# Patient Record
Sex: FEMALE | Race: OTHER | NOT HISPANIC OR LATINO | Employment: UNEMPLOYED | URBAN - METROPOLITAN AREA
[De-identification: names, ages, dates, MRNs, and addresses within clinical notes are randomized per-mention and may not be internally consistent; named-entity substitution may affect disease eponyms.]

---

## 2017-01-06 ENCOUNTER — ALLSCRIPTS OFFICE VISIT (OUTPATIENT)
Dept: OTHER | Facility: OTHER | Age: 57
End: 2017-01-06

## 2017-01-06 DIAGNOSIS — I10 ESSENTIAL (PRIMARY) HYPERTENSION: ICD-10-CM

## 2017-01-06 DIAGNOSIS — Z12.31 ENCOUNTER FOR SCREENING MAMMOGRAM FOR MALIGNANT NEOPLASM OF BREAST: ICD-10-CM

## 2017-01-06 DIAGNOSIS — B37.3 CANDIDIASIS OF VULVA AND VAGINA: ICD-10-CM

## 2017-01-06 DIAGNOSIS — E11.9 TYPE 2 DIABETES MELLITUS WITHOUT COMPLICATIONS (HCC): ICD-10-CM

## 2017-01-06 DIAGNOSIS — G57.92 MONONEUROPATHY OF LEFT LOWER EXTREMITY: ICD-10-CM

## 2017-01-06 LAB
BILIRUB UR QL STRIP: NEGATIVE
CLARITY UR: NORMAL
COLOR UR: YELLOW
GLUCOSE (HISTORICAL): NORMAL
GLUCOSE SERPL-MCNC: 265 MG/DL
HGB UR QL STRIP.AUTO: NORMAL
KETONES UR STRIP-MCNC: NORMAL MG/DL
LEUKOCYTE ESTERASE UR QL STRIP: NEGATIVE
NITRITE UR QL STRIP: NEGATIVE
PH UR STRIP.AUTO: 5.5 [PH]
PROT UR STRIP-MCNC: NEGATIVE MG/DL
SP GR UR STRIP.AUTO: 1.03
UROBILINOGEN UR QL STRIP.AUTO: 3.5

## 2017-01-09 ENCOUNTER — APPOINTMENT (OUTPATIENT)
Dept: LAB | Age: 57
End: 2017-01-09
Payer: COMMERCIAL

## 2017-01-09 ENCOUNTER — TRANSCRIBE ORDERS (OUTPATIENT)
Dept: ADMINISTRATIVE | Age: 57
End: 2017-01-09

## 2017-01-09 DIAGNOSIS — I10 ESSENTIAL (PRIMARY) HYPERTENSION: ICD-10-CM

## 2017-01-09 DIAGNOSIS — G57.92 MONONEUROPATHY OF LEFT LOWER EXTREMITY: ICD-10-CM

## 2017-01-09 DIAGNOSIS — E11.9 TYPE 2 DIABETES MELLITUS WITHOUT COMPLICATIONS (HCC): ICD-10-CM

## 2017-01-09 DIAGNOSIS — B37.3 CANDIDIASIS OF VULVA AND VAGINA: ICD-10-CM

## 2017-01-09 LAB
ALBUMIN SERPL BCP-MCNC: 3.3 G/DL (ref 3.5–5)
ALP SERPL-CCNC: 93 U/L (ref 46–116)
ALT SERPL W P-5'-P-CCNC: 24 U/L (ref 12–78)
ANION GAP SERPL CALCULATED.3IONS-SCNC: 6 MMOL/L (ref 4–13)
AST SERPL W P-5'-P-CCNC: 12 U/L (ref 5–45)
BASOPHILS # BLD AUTO: 0.05 THOUSANDS/ΜL (ref 0–0.1)
BASOPHILS NFR BLD AUTO: 1 % (ref 0–1)
BILIRUB SERPL-MCNC: 0.39 MG/DL (ref 0.2–1)
BUN SERPL-MCNC: 9 MG/DL (ref 5–25)
CALCIUM SERPL-MCNC: 9 MG/DL (ref 8.3–10.1)
CHLORIDE SERPL-SCNC: 101 MMOL/L (ref 100–108)
CHOLEST SERPL-MCNC: 178 MG/DL (ref 50–200)
CO2 SERPL-SCNC: 30 MMOL/L (ref 21–32)
CREAT SERPL-MCNC: 0.67 MG/DL (ref 0.6–1.3)
CREAT UR-MCNC: 148 MG/DL
EOSINOPHIL # BLD AUTO: 0.4 THOUSAND/ΜL (ref 0–0.61)
EOSINOPHIL NFR BLD AUTO: 5 % (ref 0–6)
ERYTHROCYTE [DISTWIDTH] IN BLOOD BY AUTOMATED COUNT: 13.4 % (ref 11.6–15.1)
EST. AVERAGE GLUCOSE BLD GHB EST-MCNC: 292 MG/DL
GFR SERPL CREATININE-BSD FRML MDRD: >60 ML/MIN/1.73SQ M
GLUCOSE SERPL-MCNC: 262 MG/DL (ref 65–140)
HBA1C MFR BLD: 11.8 % (ref 4.2–6.3)
HCT VFR BLD AUTO: 41.8 % (ref 34.8–46.1)
HDLC SERPL-MCNC: 40 MG/DL (ref 40–60)
HGB BLD-MCNC: 14.1 G/DL (ref 11.5–15.4)
LDLC SERPL CALC-MCNC: 90 MG/DL (ref 0–100)
LYMPHOCYTES # BLD AUTO: 2.26 THOUSANDS/ΜL (ref 0.6–4.47)
LYMPHOCYTES NFR BLD AUTO: 26 % (ref 14–44)
MCH RBC QN AUTO: 28.8 PG (ref 26.8–34.3)
MCHC RBC AUTO-ENTMCNC: 33.7 G/DL (ref 31.4–37.4)
MCV RBC AUTO: 85 FL (ref 82–98)
MICROALBUMIN UR-MCNC: 7.8 MG/L (ref 0–20)
MICROALBUMIN/CREAT 24H UR: 5 MG/G CREATININE (ref 0–30)
MONOCYTES # BLD AUTO: 0.47 THOUSAND/ΜL (ref 0.17–1.22)
MONOCYTES NFR BLD AUTO: 5 % (ref 4–12)
NEUTROPHILS # BLD AUTO: 5.5 THOUSANDS/ΜL (ref 1.85–7.62)
NEUTS SEG NFR BLD AUTO: 63 % (ref 43–75)
NRBC BLD AUTO-RTO: 0 /100 WBCS
PLATELET # BLD AUTO: 187 THOUSANDS/UL (ref 149–390)
PMV BLD AUTO: 12 FL (ref 8.9–12.7)
POTASSIUM SERPL-SCNC: 4.4 MMOL/L (ref 3.5–5.3)
PROT SERPL-MCNC: 7 G/DL (ref 6.4–8.2)
RBC # BLD AUTO: 4.9 MILLION/UL (ref 3.81–5.12)
SODIUM SERPL-SCNC: 137 MMOL/L (ref 136–145)
TRIGL SERPL-MCNC: 241 MG/DL
WBC # BLD AUTO: 8.73 THOUSAND/UL (ref 4.31–10.16)

## 2017-01-09 PROCEDURE — 82570 ASSAY OF URINE CREATININE: CPT

## 2017-01-09 PROCEDURE — 36415 COLL VENOUS BLD VENIPUNCTURE: CPT

## 2017-01-09 PROCEDURE — 80053 COMPREHEN METABOLIC PANEL: CPT

## 2017-01-09 PROCEDURE — 85025 COMPLETE CBC W/AUTO DIFF WBC: CPT

## 2017-01-09 PROCEDURE — 83036 HEMOGLOBIN GLYCOSYLATED A1C: CPT

## 2017-01-09 PROCEDURE — 80061 LIPID PANEL: CPT

## 2017-01-09 PROCEDURE — 82043 UR ALBUMIN QUANTITATIVE: CPT

## 2017-01-11 ENCOUNTER — GENERIC CONVERSION - ENCOUNTER (OUTPATIENT)
Dept: OTHER | Facility: OTHER | Age: 57
End: 2017-01-11

## 2017-04-15 ENCOUNTER — TRANSCRIBE ORDERS (OUTPATIENT)
Dept: ADMINISTRATIVE | Age: 57
End: 2017-04-15

## 2017-04-15 ENCOUNTER — APPOINTMENT (OUTPATIENT)
Dept: LAB | Age: 57
End: 2017-04-15
Payer: COMMERCIAL

## 2017-04-15 DIAGNOSIS — E11.9 TYPE 2 DIABETES MELLITUS WITHOUT COMPLICATIONS (HCC): ICD-10-CM

## 2017-04-15 LAB
ANION GAP SERPL CALCULATED.3IONS-SCNC: 7 MMOL/L (ref 4–13)
BUN SERPL-MCNC: 10 MG/DL (ref 5–25)
CALCIUM SERPL-MCNC: 9.1 MG/DL (ref 8.3–10.1)
CHLORIDE SERPL-SCNC: 102 MMOL/L (ref 100–108)
CO2 SERPL-SCNC: 29 MMOL/L (ref 21–32)
CREAT SERPL-MCNC: 0.76 MG/DL (ref 0.6–1.3)
EST. AVERAGE GLUCOSE BLD GHB EST-MCNC: 194 MG/DL
GFR SERPL CREATININE-BSD FRML MDRD: >60 ML/MIN/1.73SQ M
GLUCOSE P FAST SERPL-MCNC: 179 MG/DL (ref 65–99)
HBA1C MFR BLD: 8.4 % (ref 4.2–6.3)
POTASSIUM SERPL-SCNC: 4.6 MMOL/L (ref 3.5–5.3)
SODIUM SERPL-SCNC: 138 MMOL/L (ref 136–145)

## 2017-04-15 PROCEDURE — 36415 COLL VENOUS BLD VENIPUNCTURE: CPT

## 2017-04-15 PROCEDURE — 83036 HEMOGLOBIN GLYCOSYLATED A1C: CPT

## 2017-04-15 PROCEDURE — 80048 BASIC METABOLIC PNL TOTAL CA: CPT

## 2017-04-28 ENCOUNTER — ALLSCRIPTS OFFICE VISIT (OUTPATIENT)
Dept: OTHER | Facility: OTHER | Age: 57
End: 2017-04-28

## 2017-04-28 DIAGNOSIS — E11.9 TYPE 2 DIABETES MELLITUS WITHOUT COMPLICATIONS (HCC): ICD-10-CM

## 2017-04-28 DIAGNOSIS — I10 ESSENTIAL (PRIMARY) HYPERTENSION: ICD-10-CM

## 2017-04-28 DIAGNOSIS — G57.92 MONONEUROPATHY OF LEFT LOWER EXTREMITY: ICD-10-CM

## 2017-04-28 DIAGNOSIS — Z87.09 PERSONAL HISTORY OF OTHER DISEASES OF THE RESPIRATORY SYSTEM: ICD-10-CM

## 2017-04-28 DIAGNOSIS — Z00.00 ENCOUNTER FOR GENERAL ADULT MEDICAL EXAMINATION WITHOUT ABNORMAL FINDINGS: ICD-10-CM

## 2017-11-17 ENCOUNTER — TRANSCRIBE ORDERS (OUTPATIENT)
Dept: ADMINISTRATIVE | Age: 57
End: 2017-11-17

## 2017-11-17 ENCOUNTER — APPOINTMENT (OUTPATIENT)
Dept: LAB | Age: 57
End: 2017-11-17
Payer: COMMERCIAL

## 2017-11-17 DIAGNOSIS — I10 ESSENTIAL (PRIMARY) HYPERTENSION: ICD-10-CM

## 2017-11-17 DIAGNOSIS — Z87.09 PERSONAL HISTORY OF OTHER DISEASES OF THE RESPIRATORY SYSTEM: ICD-10-CM

## 2017-11-17 DIAGNOSIS — Z00.00 ENCOUNTER FOR GENERAL ADULT MEDICAL EXAMINATION WITHOUT ABNORMAL FINDINGS: ICD-10-CM

## 2017-11-17 DIAGNOSIS — E11.9 TYPE 2 DIABETES MELLITUS WITHOUT COMPLICATIONS (HCC): ICD-10-CM

## 2017-11-17 DIAGNOSIS — G57.92 MONONEUROPATHY OF LEFT LOWER EXTREMITY: ICD-10-CM

## 2017-11-17 LAB
ANION GAP SERPL CALCULATED.3IONS-SCNC: 5 MMOL/L (ref 4–13)
BUN SERPL-MCNC: 7 MG/DL (ref 5–25)
CALCIUM SERPL-MCNC: 9.1 MG/DL (ref 8.3–10.1)
CHLORIDE SERPL-SCNC: 104 MMOL/L (ref 100–108)
CHOLEST SERPL-MCNC: 182 MG/DL (ref 50–200)
CO2 SERPL-SCNC: 29 MMOL/L (ref 21–32)
CREAT SERPL-MCNC: 0.77 MG/DL (ref 0.6–1.3)
EST. AVERAGE GLUCOSE BLD GHB EST-MCNC: 229 MG/DL
GFR SERPL CREATININE-BSD FRML MDRD: 85 ML/MIN/1.73SQ M
GLUCOSE P FAST SERPL-MCNC: 177 MG/DL (ref 65–99)
HBA1C MFR BLD: 9.6 % (ref 4.2–6.3)
HDLC SERPL-MCNC: 38 MG/DL (ref 40–60)
LDLC SERPL CALC-MCNC: 82 MG/DL (ref 0–100)
POTASSIUM SERPL-SCNC: 4.8 MMOL/L (ref 3.5–5.3)
SODIUM SERPL-SCNC: 138 MMOL/L (ref 136–145)
TRIGL SERPL-MCNC: 312 MG/DL

## 2017-11-17 PROCEDURE — 83036 HEMOGLOBIN GLYCOSYLATED A1C: CPT

## 2017-11-17 PROCEDURE — 80048 BASIC METABOLIC PNL TOTAL CA: CPT

## 2017-11-17 PROCEDURE — 80061 LIPID PANEL: CPT

## 2017-11-17 PROCEDURE — 36415 COLL VENOUS BLD VENIPUNCTURE: CPT

## 2017-11-20 ENCOUNTER — GENERIC CONVERSION - ENCOUNTER (OUTPATIENT)
Dept: OTHER | Facility: OTHER | Age: 57
End: 2017-11-20

## 2017-12-08 ENCOUNTER — ALLSCRIPTS OFFICE VISIT (OUTPATIENT)
Dept: OTHER | Facility: OTHER | Age: 57
End: 2017-12-08

## 2017-12-08 ENCOUNTER — APPOINTMENT (OUTPATIENT)
Dept: LAB | Facility: HOSPITAL | Age: 57
End: 2017-12-08
Payer: COMMERCIAL

## 2017-12-08 DIAGNOSIS — Z12.11 ENCOUNTER FOR SCREENING FOR MALIGNANT NEOPLASM OF COLON: ICD-10-CM

## 2017-12-08 DIAGNOSIS — E11.9 TYPE 2 DIABETES MELLITUS WITHOUT COMPLICATIONS (HCC): ICD-10-CM

## 2017-12-08 LAB
CREAT UR-MCNC: 37.3 MG/DL
MICROALBUMIN UR-MCNC: <5 MG/L (ref 0–20)
MICROALBUMIN/CREAT 24H UR: <13 MG/G CREATININE (ref 0–30)

## 2017-12-08 PROCEDURE — 82043 UR ALBUMIN QUANTITATIVE: CPT

## 2017-12-08 PROCEDURE — 82570 ASSAY OF URINE CREATININE: CPT

## 2017-12-09 NOTE — PROGRESS NOTES
Assessment    1  Diabetes mellitus, type 2 (250 00) (E11 9)   2  Hypertension (401 9) (I10)   3  Acute low back pain (724 2) (M54 5)    Plan  Acute low back pain    · Ibuprofen 600 MG Oral Tablet; take one tablet every 8 hours as needed for pain   · Methocarbamol 750 MG Oral Tablet; Take one tablets 3-4 times daily as needed forpain/muscle spasms  Diabetes mellitus, type 2    · Invokana 100 MG Oral Tablet; take one tablet by mouth daily   · (1) BASIC METABOLIC PROFILE; Status:Active; Requested for:16Sgl6078;    · (1) HEMOGLOBIN A1C; Status:Active; Requested for:16Ygx7126;    · (1) MICROALBUMIN CREATININE RATIO, RANDOM URINE; Status:Active; Requestedfor:87Lyo5888;    · *VB - Eye Exam; Status:Active; Requested for:96Uar7777;   Encounter for screening colonoscopy    · * MAMMO SCREENING BILATERAL W CAD; Status:Hold For - Scheduling; Requestedfor:65Vxp0310;    · COLONOSCOPY; Status:Active; Requested for:29Vmc7582;    · *1 -  [facility]  *  Status:Active  Requested for: 40DNE6611  Care Summary provided  : Yes    Discussion/Summary    Apply heat to area, take meds as directed  if no improvement of sxs in 10 day or any worsenign sxsadd med to DM regimen, discussed better diet and wt loss  Hydrate well with water 6-8 glasses a dayin 3 mo + labs  The patient was counseled regarding diagnostic results,-- instructions for management,-- risk factor reductions,-- impressions,-- risks and benefits of treatment options,-- importance of compliance with treatment  total time of encounter was 30 minutes  Possible side effects of new medications were reviewed with the patient/guardian today  The treatment plan was reviewed with the patient/guardian  The patient/guardian understands and agrees with the treatment plan      Chief Complaint  Patient here for follow upcomplain of back pain for 2 dayscreening neg   Patient is here today for follow up of chronic conditions described in HPI  History of Present Illness  Pt presents for follow up of chronic conditions and labs  uncontrolled  A1c 9 6  compliant with meds, admits not following diet  she bend forward and when got up she got back pain, has taken Tylenol with no improvement  Also using heating pad  No numbness, no weakness, no tingling  Review of Systems   Constitutional: No fever, no chills, feels well, no tiredness, no recent weight gain or weight loss  Cardiovascular: No complaints of slow heart rate, no fast heart rate, no chest pain, no palpitations, no leg claudication, no lower extremity edema  Respiratory: No complaints of shortness of breath, no wheezing, no cough, no SOB on exertion, no orthopnea, no PND  Gastrointestinal: No complaints of abdominal pain, no constipation, no nausea or vomiting, no diarrhea, no bloody stools  Genitourinary: No complaints of dysuria, no incontinence, no pelvic pain, no dysmenorrhea, no vaginal discharge or bleeding  Psychiatric: Not suicidal, no sleep disturbance, no anxiety or depression, no change in personality, no emotional problems  Active Problems    1  Diabetes mellitus, type 2 (250 00) (E11 9)   2  Encounter for screening colonoscopy (V76 51) (Z12 11)   3  Encounter for screening mammogram for malignant neoplasm of breast (V76 12) (Z12 31)   4  Hypertension (401 9) (I10)   5  Influenza vaccine needed (V04 81) (Z23)   6  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)   7  Vaginal yeast infection (112 1) (B37 3)    Past Medical History  1  History of Bloody stools (578 1) (K92 1)   2  History of Eye problem (V41 1) (H57 9)   3  History of asthma (V12 69) (Z87 09)   4  History of backache (V13 59) (Z87 39)   5  History of diarrhea (V12 79) (Z87 898)   6  History of insomnia (V13 89) (Z87 898)   7  History of pneumonia (V12 61) (Z87 01)   8  History of Indigestion (536 8) (K30)    The active problems and past medical history were reviewed and updated today        Surgical History  1  History of Hysterectomy    Family History  Mother    1  Family history of asthma (V17 5) (Z82 5)  Father    2  Family history of cardiac disorder (V17 49) (Z82 49)   3  Family history of hypertension (V17 49) (Z82 49)  Son    4  Family history of asthma (V17 5) (Z82 5)  Sister    5  Family history of diabetes mellitus (V18 0) (Z83 3)   6  Family history of hypertension (V17 49) (Z82 49)   7  Family history of kidney disease (V18 69) (Z84 1)   8  Family history of malignant neoplasm (V16 9) (Z80 9)   9  Family history of osteoporosis (V17 81) (Z82 62)  Brother    10  Family history of cardiac disorder (V17 49) (Z82 49)   11  Family history of cerebrovascular accident (CVA) (V17 1) (Z82 3)   15  Family history of malignant neoplasm (V16 9) (Z80 9)    Social History     · Always uses seat belt   · Caffeine use (V49 89) (F15 90)   · Does not drink alcohol (V49 89) (Z78 9)   · Housewife or homemaker   · Inadequate exercise (V69 0) (Z72 3)   ·    · Never a smoker   · No illicit drug use   · Non-smoker (V49 89) (Z78 9)   · Three children    Current Meds   1  Gabapentin 600 MG Oral Tablet; TAKE 2 TABLET AT BEDTIME  Requested for: 21EMF1482; Last Rx:09Oct2017 Ordered   2  Glimepiride 4 MG Oral Tablet; TAKE ONE TABLET BY MOUTH ONCE DAILY AS DIRECTED; Therapy: 10TOT3990 to (Evaluate:36Noa1238)  Requested for: 34UZO9655; Last Rx:17Oct2017 Ordered   3  Lisinopril 10 MG Oral Tablet; TAKE 1 TABLET DAILY  Requested for: 04HRB9300; Last Rx:09Oct2017 Ordered   4  MetFORMIN HCl - 1000 MG Oral Tablet; TAKE 1 TABLET TWICE DAILY; Therapy: 10NBW9722 to (Evaluate:61Res1819)  Requested for: 24GGA3525; Last Rx:30Nov2017 Ordered   5  Ventolin  (90 Base) MCG/ACT Inhalation Aerosol Solution; INHALE 2 PUFFS EVERY 4-6 HOURS AS NEEDED; Therapy: 28Apr2017 to (Kel Ca)  Requested for: 28Apr2017; Last Rx:28Apr2017 Ordered    The medication list was reviewed and updated today  Allergies  1   No Known Drug Allergies  2  Latex    Vitals  Vital Signs    Recorded: 94RVE9784 11:21AM   Temperature 97 8 F   Heart Rate 94   Respiration 16   Systolic 099   Diastolic 80   Height 5 ft 1 02 in   Weight 156 lb    BMI Calculated 29 46   BSA Calculated 1 7   O2 Saturation 98   Pain Scale 7       Physical Exam   Constitutional  General appearance: No acute distress, well appearing and well nourished  Eyes  Conjunctiva and lids: No swelling, erythema or discharge  Pulmonary  Respiratory effort: No increased work of breathing or signs of respiratory distress  Auscultation of lungs: Clear to auscultation  Cardiovascular  Auscultation of heart: Normal rate and rhythm, normal S1 and S2, without murmurs  Examination of extremities for edema and/or varicosities: Normal    Abdomen  Abdomen: Non-tender, no masses  Musculoskeletal  Gait and station: Normal    Inspection/palpation of joints, bones, and muscles: Normal    Psychiatric  Mood and affect: Normal    Additional Exam:  back- normal to inspection, neg straight leg raise, + tenderness at the lumbar paraspinal muscle BL  Normal ROM  Future Appointments    Date/Time Provider Specialty Site   03/16/2018 10:30 AM CIRA Sorensen   Family Medicine [address]       Signatures   Electronically signed by : CIRA Ardon ; Dec  8 2017  1:36PM EST                       (Author)

## 2018-01-09 NOTE — RESULT NOTES
Verified Results  (1) HEMOGLOBIN A1C [id number] 07:11AM Hernan Han Order Number: [id number]     Test Name Result Flag Reference   HEMOGLOBIN A1C 9 6 % H 4 2-6 3   EST  AVG  GLUCOSE 229 mg/dl       (1) BASIC METABOLIC PROFILE [id number] 07:11AM Hernan Han Order Number: [id number]     Test Name Result Flag Reference   SODIUM 138 mmol/L  136-145   POTASSIUM 4 8 mmol/L  3 5-5 3   CHLORIDE 104 mmol/L  100-108   CARBON DIOXIDE 29 mmol/L  21-32   ANION GAP (CALC) 5 mmol/L  4-13   BLOOD UREA NITROGEN 7 mg/dL  5-25   CREATININE 0 77 mg/dL  0 60-1 30   Standardized to IDMS reference method   CALCIUM 9 1 mg/dL  8 3-10 1   eGFR 85 ml/min/1 73sq m     National Kidney Disease Education Program recommendations are as follows:  GFR calculation is accurate only with a steady state creatinine  Chronic Kidney disease less than 60 ml/min/1 73 sq  meters  Kidney failure less than 15 ml/min/1 73 sq  meters  GLUCOSE FASTING 177 mg/dL H 65-99   Specimen collection should occur prior to Sulfasalazine administration due to the potential for falsely depressed results  Specimen collection should occur prior to Sulfapyridine administration due to the potential for falsely elevated results  (1) LIPID PANEL FASTING W DIRECT LDL REFLEX [id number] 07:11AM Hernan Han Order Number: [id number]     Test Name Result Flag Reference   CHOLESTEROL 182 mg/dL     LDL CHOLESTEROL CALCULATED 82 mg/dL  0-100   Triglyceride:        Normal <150 mg/dl   Borderline High 150-199 mg/dl   High 200-499 mg/dl   Very High >499 mg/dl      Cholesterol:       Desirable <200 mg/dl    Borderline High 200-239 mg/dl    High >239 mg/dl      HDL Cholesterol:       High>59 mg/dL    Low <41 mg/dL      HDL Cholesterol:       High>59 mg/dL    Low <41 mg/dL      This screening LDL is a calculated result     It does not have the accuracy of the Direct Measured LDL in the monitoring of patients with hyperlipidemia and/or statin therapy  Direct Measure LDL (RIE646) must be ordered separately in these patients  TRIGLYCERIDES 312 mg/dL H <=150   Specimen collection should occur prior to N-Acetylcysteine or Metamizole administration due to the potential for falsely depressed results  HDL,DIRECT 38 mg/dL L 40-60   Specimen collection should occur prior to Metamizole administration due to the potential for falsley depressed results         Signatures   Electronically signed by : CIRA Go ; Nov 20 2017 11:07AM EST                       (Author)

## 2018-01-12 VITALS
SYSTOLIC BLOOD PRESSURE: 138 MMHG | TEMPERATURE: 98.1 F | BODY MASS INDEX: 28.79 KG/M2 | HEART RATE: 89 BPM | DIASTOLIC BLOOD PRESSURE: 86 MMHG | OXYGEN SATURATION: 97 % | HEIGHT: 61 IN | WEIGHT: 152.5 LBS

## 2018-01-12 NOTE — RESULT NOTES
Message   please notify pt her diabtes is very uncontolled, her A1c is very high  I want her to take metformign 1,000mg bid and also will send another medicine to pharmacy called glimiperide fr her to also take everyday  Call if any sxs of hyperglycemia or hypoglycemia (discuss with her)  FU in 3 month with labs again  Please mail  tx     Verified Results  (1) HEMOGLOBIN A1C [id number] 08:26AM Lulkevyn Malagon Order Number: [id number]     Test Name Result Flag Reference   HEMOGLOBIN A1C 11 8 % H 4 2-6 3   EST  AVG  GLUCOSE 292 mg/dl       (1) LIPID PANEL FASTING W DIRECT LDL REFLEX [id number] 08:26AM Lulkevyn Malagon Order Number: [id number]     Test Name Result Flag Reference   CHOLESTEROL 178 mg/dL     LDL CHOLESTEROL CALCULATED 90 mg/dL  0-100   - Patient Instructions: This is a fasting blood test  Water, black tea or black coffee only after 9:00pm the night before test   Drink 2 glasses of water the morning of test     - Patient Instructions: This is a fasting blood test  Water, black tea or black coffee only after 9:00pm the night before test Drink 2 glasses of water the morning of test   Triglyceride:         Normal              <150 mg/dl       Borderline High    150-199 mg/dl       High               200-499 mg/dl       Very High          >499 mg/dl  Cholesterol:         Desirable        <200 mg/dl      Borderline High  200-239 mg/dl      High             >239 mg/dl  HDL Cholesterol:        High    >59 mg/dL      Low     <41 mg/dL  LDL Cholesterol:        Optimal          <100 mg/dl        Near Optimal     100-129 mg/dl        Above Optimal          Borderline High   130-159 mg/dl          High              160-189 mg/dl          Very High        >189 mg/dl  LDL CALCULATED:    This screening LDL is a calculated result  It does not have the accuracy of the Direct Measured LDL in the monitoring of patients with hyperlipidemia and/or statin therapy     Direct Measure LDL (CPX321) must be ordered separately in these patients  TRIGLYCERIDES 241 mg/dL H <=150   Specimen collection should occur prior to N-Acetylcysteine or Metamizole administration due to the potential for falsely depressed results  HDL,DIRECT 40 mg/dL  40-60   Specimen collection should occur prior to Metamizole administration due to the potential for falsely depressed results  (1) MICROALBUMIN CREATININE RATIO, RANDOM URINE [id number] 08:26AM Lashawn Lane Order Number: [id number]     Test Name Result Flag Reference   MICROALBUMIN/ CREAT R 5 mg/g creatinine  0-30   MICROALBUMIN,URINE 7 8 mg/L  0 0-20 0   CREATININE URINE 148 0 mg/dL       (1) CBC/PLT/DIFF [id number] 08:26AM Lashawn Lane Order Number: [id number]     Test Name Result Flag Reference   WBC COUNT 8 73 Thousand/uL  4 31-10 16   RBC COUNT 4 90 Million/uL  3 81-5 12   HEMOGLOBIN 14 1 g/dL  11 5-15 4   HEMATOCRIT 41 8 %  34 8-46  1   MCV 85 fL  82-98   MCH 28 8 pg  26 8-34 3   MCHC 33 7 g/dL  31 4-37 4   RDW 13 4 %  11 6-15 1   MPV 12 0 fL  8 9-12 7   PLATELET COUNT 813 Thousands/uL  149-390   nRBC AUTOMATED 0 /100 WBCs     NEUTROPHILS RELATIVE PERCENT 63 %  43-75   LYMPHOCYTES RELATIVE PERCENT 26 %  14-44   MONOCYTES RELATIVE PERCENT 5 %  4-12   EOSINOPHILS RELATIVE PERCENT 5 %  0-6   BASOPHILS RELATIVE PERCENT 1 %  0-1   NEUTROPHILS ABSOLUTE COUNT 5 50 Thousands/?L  1 85-7 62   LYMPHOCYTES ABSOLUTE COUNT 2 26 Thousands/?L  0 60-4 47   MONOCYTES ABSOLUTE COUNT 0 47 Thousand/?L  0 17-1 22   EOSINOPHILS ABSOLUTE COUNT 0 40 Thousand/?L  0 00-0 61   BASOPHILS ABSOLUTE COUNT 0 05 Thousands/?L  0 00-0 10   - Patient Instructions: This bloodwork is non-fasting  Please drink two glasses of water morning of bloodwork  - Patient Instructions: This bloodwork is non-fasting  Please drink two glasses of water morning of bloodwork       (1) COMPREHENSIVE METABOLIC PANEL [id number] 76:90CC Jesus MOSS Order Number: [id number]     Test Name Result Flag Reference   GLUCOSE,RANDM 262 mg/dL H    If the patient is fasting, the ADA then defines impaired fasting glucose as > 100 mg/dL and diabetes as > or equal to 123 mg/dL  SODIUM 137 mmol/L  136-145   POTASSIUM 4 4 mmol/L  3 5-5 3   CHLORIDE 101 mmol/L  100-108   CARBON DIOXIDE 30 mmol/L  21-32   ANION GAP (CALC) 6 mmol/L  4-13   BLOOD UREA NITROGEN 9 mg/dL  5-25   CREATININE 0 67 mg/dL  0 60-1 30   Standardized to IDMS reference method   CALCIUM 9 0 mg/dL  8 3-10 1   BILI, TOTAL 0 39 mg/dL  0 20-1 00   ALK PHOSPHATAS 93 U/L     ALT (SGPT) 24 U/L  12-78   AST(SGOT) 12 U/L  5-45   ALBUMIN 3 3 g/dL L 3 5-5 0   TOTAL PROTEIN 7 0 g/dL  6 4-8 2   eGFR Non-African American      >60 0 ml/min/1 73sq m   - Patient Instructions: This is a fasting blood test  Water, black tea or black coffee only after 9:00pm the night before test Drink 2 glasses of water the morning of test   National Kidney Disease Education Program recommendations are as follows:  GFR calculation is accurate only with a steady state creatinine  Chronic Kidney disease less than 60 ml/min/1 73 sq  meters  Kidney failure less than 15 ml/min/1 73 sq  meters  Plan  Diabetes mellitus, type 2    · Glimepiride 2 MG Oral Tablet; TOME 1 TABLETA DIARIAMENTE AYDEN  INDICADO   · (1) BASIC METABOLIC PROFILE; Status:Active; Requested LSW:39NQP7240;    · (1) HEMOGLOBIN A1C; Status:Active;  Requested DCV:09PNM0968;     Signatures   Electronically signed by : CIRA Herman ; Jan 11 2017  7:50PM EST                       (Author)

## 2018-01-13 VITALS
HEART RATE: 82 BPM | HEIGHT: 61 IN | SYSTOLIC BLOOD PRESSURE: 120 MMHG | WEIGHT: 157.13 LBS | BODY MASS INDEX: 29.66 KG/M2 | TEMPERATURE: 98.1 F | DIASTOLIC BLOOD PRESSURE: 80 MMHG | OXYGEN SATURATION: 99 %

## 2018-01-23 VITALS
DIASTOLIC BLOOD PRESSURE: 80 MMHG | WEIGHT: 156 LBS | TEMPERATURE: 97.8 F | HEIGHT: 61 IN | BODY MASS INDEX: 29.45 KG/M2 | HEART RATE: 94 BPM | RESPIRATION RATE: 16 BRPM | OXYGEN SATURATION: 98 % | SYSTOLIC BLOOD PRESSURE: 122 MMHG

## 2018-01-29 DIAGNOSIS — E11.9 TYPE 2 DIABETES MELLITUS WITHOUT COMPLICATION, WITHOUT LONG-TERM CURRENT USE OF INSULIN (HCC): Primary | ICD-10-CM

## 2018-02-01 ENCOUNTER — HOSPITAL ENCOUNTER (EMERGENCY)
Facility: HOSPITAL | Age: 58
Discharge: HOME | End: 2018-02-01
Attending: EMERGENCY MEDICINE | Admitting: EMERGENCY MEDICINE
Payer: COMMERCIAL

## 2018-02-01 VITALS
DIASTOLIC BLOOD PRESSURE: 78 MMHG | WEIGHT: 159 LBS | BODY MASS INDEX: 30.02 KG/M2 | SYSTOLIC BLOOD PRESSURE: 141 MMHG | RESPIRATION RATE: 18 BRPM | HEART RATE: 103 BPM | OXYGEN SATURATION: 97 % | TEMPERATURE: 98.3 F

## 2018-02-01 DIAGNOSIS — L25.9 CONTACT DERMATITIS: ICD-10-CM

## 2018-02-01 DIAGNOSIS — E11.9 TYPE 2 DIABETES MELLITUS WITHOUT COMPLICATION, WITHOUT LONG-TERM CURRENT USE OF INSULIN (HCC): ICD-10-CM

## 2018-02-01 DIAGNOSIS — B34.9 ACUTE VIRAL SYNDROME: Primary | ICD-10-CM

## 2018-02-01 PROCEDURE — 99283 EMERGENCY DEPT VISIT LOW MDM: CPT

## 2018-02-01 RX ORDER — PIOGLITAZONEHYDROCHLORIDE 15 MG/1
15 TABLET ORAL DAILY
COMMUNITY

## 2018-02-01 RX ORDER — ONDANSETRON 4 MG/1
4 TABLET, ORALLY DISINTEGRATING ORAL ONCE
Status: COMPLETED | OUTPATIENT
Start: 2018-02-01 | End: 2018-02-01

## 2018-02-01 RX ORDER — ACETAMINOPHEN 325 MG/1
975 TABLET ORAL ONCE
Status: COMPLETED | OUTPATIENT
Start: 2018-02-01 | End: 2018-02-01

## 2018-02-01 RX ORDER — ACETAMINOPHEN 500 MG
1000 TABLET ORAL EVERY 6 HOURS PRN
Qty: 30 TABLET | Refills: 0 | Status: SHIPPED | OUTPATIENT
Start: 2018-02-01

## 2018-02-01 RX ORDER — ONDANSETRON 4 MG/1
4 TABLET, ORALLY DISINTEGRATING ORAL EVERY 6 HOURS PRN
Qty: 20 TABLET | Refills: 0 | Status: SHIPPED | OUTPATIENT
Start: 2018-02-01

## 2018-02-01 RX ORDER — ACETAMINOPHEN 325 MG/1
650 TABLET ORAL ONCE
Status: DISCONTINUED | OUTPATIENT
Start: 2018-02-01 | End: 2018-02-01 | Stop reason: HOSPADM

## 2018-02-01 RX ORDER — LORATADINE 10 MG/1
10 TABLET ORAL DAILY PRN
Qty: 20 TABLET | Refills: 0 | Status: SHIPPED | OUTPATIENT
Start: 2018-02-01

## 2018-02-01 RX ADMIN — ACETAMINOPHEN 975 MG: 325 TABLET, FILM COATED ORAL at 11:37

## 2018-02-01 RX ADMIN — ONDANSETRON 4 MG: 4 TABLET, ORALLY DISINTEGRATING ORAL at 11:32

## 2018-02-01 NOTE — ED PROVIDER NOTES
History  Chief Complaint   Patient presents with    Headache     pt c/o headache, fevers and nausea  Entire family has same symptoms     Patient presents with 3 days of headache, fevers, cough, diffuse myalgias, and nausea  Patient denies neck stiffness, trauma, chest pain, shortness of breath, numbness, and weakness  Two grandchildren with some or complaints  Patient denies getting her flu shot this year  Patient has past medical history significant for diabetes and hypertension  She states that she has been taking TheraFlu over-the-counter no additional complaints  Also reports a forehead rash for 1 day  History provided by:  Patient   used: Yes    Headache   Pain location:  Generalized  Quality:  Dull  Radiates to:  Does not radiate  Onset quality:  Gradual  Duration:  3 days  Timing:  Constant  Context: coughing    Relieved by:  Nothing  Worsened by:  Nothing  Associated symptoms: congestion, cough, fever, myalgias, nausea and sinus pressure    Associated symptoms: no abdominal pain, no back pain, no eye pain and no weakness        Prior to Admission Medications   Prescriptions Last Dose Informant Patient Reported? Taking? albuterol (PROVENTIL HFA,VENTOLIN HFA) 90 mcg/act inhaler   Yes Yes   Sig: Inhale 2 puffs every 6 (six) hours as needed for wheezing  gabapentin (NEURONTIN) 600 MG tablet   Yes Yes   Sig: Take 600 mg by mouth daily Indications: Restless Leg Syndrome  lisinopril (ZESTRIL) 20 mg tablet   Yes Yes   Sig: Take 20 mg by mouth daily     metFORMIN (GLUCOPHAGE) 1000 MG tablet   No No   Sig: Take 1 tablet (1,000 mg total) by mouth 2 (two) times a day   pioglitazone (ACTOS) 15 mg tablet   Yes Yes   Sig: Take 15 mg by mouth daily      Facility-Administered Medications: None       Past Medical History:   Diagnosis Date    Asthma     Diabetes mellitus ([facility] [address] )     Hypertension        Past Surgical History:   Procedure Laterality Date    CHOLECYSTECTOMY      HYSTERECTOMY      HYSTERECTOMY         Family History   Problem Relation Age of Onset    Asthma Mother      I have reviewed and agree with the history as documented  Social History   Substance Use Topics    Smoking status: Never Smoker    Smokeless tobacco: Not on file    Alcohol use No        Review of Systems   Constitutional: Positive for fever  Negative for chills  HENT: Positive for congestion, sinus pressure and sneezing  Negative for rhinorrhea  Eyes: Negative for pain and redness  Respiratory: Positive for cough  Negative for chest tightness and shortness of breath  Cardiovascular: Negative for chest pain and leg swelling  Gastrointestinal: Positive for nausea  Negative for abdominal pain  Genitourinary: Negative for dysuria and flank pain  Musculoskeletal: Positive for myalgias  Negative for back pain  Skin: Positive for rash (forehead)  Neurological: Positive for headaches  Negative for tremors and weakness  Psychiatric/Behavioral: Negative for agitation and confusion  All other systems reviewed and are negative  Physical Exam  ED Triage Vitals   Temperature Pulse Respirations Blood Pressure SpO2   02/01/18 1039 02/01/18 1037 02/01/18 1037 02/01/18 1037 02/01/18 1037   99 1 °F (37 3 °C) (!) 110 18 141/78 95 %      Temp Source Heart Rate Source Patient Position - Orthostatic VS BP Location FiO2 (%)   02/01/18 1039 02/01/18 1225 -- -- --   Oral Monitor         Pain Score       02/01/18 1037       6           Orthostatic Vital Signs  Vitals:    02/01/18 1037 02/01/18 1225   BP: 141/78    Pulse: (!) 110 103       Physical Exam   Constitutional: She is oriented to person, place, and time  She appears well-developed and well-nourished  HENT:   Head: Normocephalic and atraumatic     Right Ear: External ear normal    Left Ear: External ear normal    Nose: Nose normal    Mouth/Throat: Oropharynx is clear and moist    Eyes: EOM are normal  Pupils are equal, round, and reactive to light  Neck: Normal range of motion  Neck supple  Cardiovascular: Normal rate and regular rhythm  Pulmonary/Chest: Effort normal and breath sounds normal    Abdominal: Soft  Bowel sounds are normal    Musculoskeletal: Normal range of motion  She exhibits no edema or tenderness  Neurological: She is alert and oriented to person, place, and time  Skin: Skin is warm and dry  Rash (forehead) noted  Psychiatric: She has a normal mood and affect  Her behavior is normal    Nursing note and vitals reviewed  ED Medications  Medications   acetaminophen (TYLENOL) tablet 975 mg (975 mg Oral Given 2/1/18 1137)   ondansetron (ZOFRAN-ODT) dispersible tablet 4 mg (4 mg Oral Given 2/1/18 1132)       Diagnostic Studies  Results Reviewed     None                 No orders to display              Procedures  Procedures       Phone Contacts  ED Phone Contact    ED Course  ED Course                                MDM  Number of Diagnoses or Management Options  Acute viral syndrome: new and does not require workup  Contact dermatitis: new and does not require workup  Risk of Complications, Morbidity, and/or Mortality  Presenting problems: moderate  Management options: moderate  General comments: Patient feels better with symptomatic treatment  Patient Progress  Patient progress: improved    CritCare Time    Disposition  Final diagnoses:   Acute viral syndrome   Contact dermatitis     Time reflects when diagnosis was documented in both MDM as applicable and the Disposition within this note     Time User Action Codes Description Comment    2/1/2018 12:36 PM Daniella James [B34 9] Acute viral syndrome     2/1/2018 12:39 PM Daniella James [L25 9] Contact dermatitis       ED Disposition     ED Disposition Condition Comment    Discharge  Jaime Mini discharge to home/self care      Condition at discharge: Good        Follow-up Information    None       Discharge Medication List as of 2/1/2018 12:39 PM START taking these medications    Details   acetaminophen (TYLENOL) 500 mg tablet Take 2 tablets (1,000 mg total) by mouth every 6 (six) hours as needed for fever, Starting u 2/1/2018, Normal      loratadine (CLARITIN) 10 mg tablet Take 1 tablet (10 mg total) by mouth daily as needed for allergies, Starting Thu 2/1/2018, Normal      ondansetron (ZOFRAN-ODT) 4 mg disintegrating tablet Take 1 tablet (4 mg total) by mouth every 6 (six) hours as needed for nausea or vomiting, Starting u 2/1/2018, Normal         CONTINUE these medications which have NOT CHANGED    Details   metFORMIN (GLUCOPHAGE) 1000 MG tablet TAKE ONE TABLET BY MOUTH TWICE DAILY, Normal      albuterol (PROVENTIL HFA,VENTOLIN HFA) 90 mcg/act inhaler Inhale 2 puffs every 6 (six) hours as needed for wheezing , Until Discontinued, Historical Med      gabapentin (NEURONTIN) 600 MG tablet Take 600 mg by mouth daily Indications: Restless Leg Syndrome  , Until Discontinued, Historical Med      lisinopril (ZESTRIL) 20 mg tablet Take 20 mg by mouth daily  , Until Discontinued, Historical Med      pioglitazone (ACTOS) 15 mg tablet Take 15 mg by mouth daily, Historical Med           No discharge procedures on file      ED Provider  Electronically Signed by           Viviana Schulte MD  02/03/18 9808

## 2018-02-01 NOTE — DISCHARGE INSTRUCTIONS
Síndrome viral   LO QUE NECESITA SABER:   El síndrome viral es un término general usado para describir lexi infección viral que no tiene lexi causa definida  Los virus son propagados fácilmente de Carlena [place] persona a otra a través del aire y [place] los objetos que se comparten  INSTRUCCIONES [facility]BRE [facility]:   Llame al 911 en julissa de presentar lo siguiente:   · Usted sufre lexi convulsión  · No es posible despertarlo  · Usted tiene dolor torácico y dificultad para respirar  Busque atención médica de inmediat    o si:   · Usted tiene rigidez en el antonieta, dolor de burak intenso y sensibilidad a la wiliam  · Usted se siente débil, mareado o confundido  · Usted gin de orinar u orina menos de lo normal      · Usted tose ermelinda o lexi mucosidad espesa amarilla o el  · Usted tiene dolor abdominal severo o josé abdomen está más jyoti de lo habitual   Pregúntele a josé médico qué vitaminas y minerales son adecuados para usted  · Carline síntomas no mejoran con el tratamiento o empeoran después de 3 días  · Usted tiene salpullido o dolor de oído  · Usted siente ardor al Jonathan Nasuti  · Usted tiene preguntas o inquietudes acerca de josé condición o cuidado  Medicamentos:  Es posible que usted necesite alguno de los siguientes:  · El acetaminofén  elissa el dolor y baja la fiebre  Está disponible sin receta médica  Pregunte cuánto medicamento debe lenora y con qué frecuencia  [address]  El acetaminofén puede causar daño en el hígado cuando no se minerva de forma correcta  · AINEs (Analgésicos antiinflamatorios no esteroides) aj el ibuprofeno, ayudan a disminuir la inflamación, el dolor y la [place]  Los AINEs pueden causar sangrado estomacal o problemas renales en ciertas personas  Si usted minerva un medicamento anticoagulante, siempre pregúntele a josé médico si los MARCY son seguros para usted  Siempre ranjan la etiqueta de juan medicamento y [place] instrucciones      · El medicamento para el resfriado  ayuda a disminuir la inflamación, a controlar la tos y a aliviar la congestión del pecho o nasal      · El rociador nasal de agua salina  ayuda a disminuir la congestión nasal      · [place] dean medicamentos aj se le haya indicado  Consulte con josé médico si usted rey que josé medicamento no le está ayudando o si presenta efectos secundarios  Infórmele si es alérgico a algún medicamento  Mantenga lexi lista actualizada de los [place], las vitaminas y los productos herbales que minerva  Incluya los siguientes datos de los medicamentos: cantidad, frecuencia y motivo de administración  Traiga con usted la lista o los envases de la píldoras a dean citas de seguimiento  Lleve la lista de los medicamentos con usted en julissa de lexi emergencia  El manejo de josé síntomas:   · Consuma líquidos según le indicaron  para evitar la deshidratación  Pregunte cuánto líquido debe lenora cada día y cuáles líquidos son los más adecuados para usted  Pregunte si usted debería lenora lexi solución de rehidratación oral (SRO)  [address] cantidades exactas de agua, sal y azúcar que usted necesita para reemplazar los líquidos corporales  [place] podría ayudarlo a evitar la deshidratación a causa del vómito y de la diarrea  No tome líquidos con cafeína  Los líquidos con cafeína pueden empeorar la deshidratación  · Descanse lo suficiente  para ayudar a que josé cuerpo sane  Moriarty siestas avtar el día  Pregunte a josé médico cuándo puede regresar a josé trabajo y a dean actividades cotidianas  · Use un humidificador de pati frío  para ayudarlo a respirar más fácilmente si usted tiene congestión nasal o del pecho  Pregunte a josé médico cómo usar un humidificador de vapor frío  · Coma miel o use caramelos para la tos  para ayudar a disminuir la molestia de la garganta  Pregunte a josé médico cuánta miel debería comer cada día  Los caramelos para la tos están disponibles sin receta médica   Siga las indicaciones para lenora los caramelos para la tos  · No fume y permanezca lejos de otras personas que fuman  La nicotina y otras sustancias químicas que contienen los cigarrillos y cigarros pueden dañar los pulmones  El fumar también puede retrasar la sanación  Pida información a bryant médico si usted actualmente fuma y necesita ayuda para dejar de fumar  Los cigarrillos electrónicos o tabaco sin humo todavía contienen nicotina  Consulte con bryant médico antes de [employer]  · Lávese las freedom frecuentemente  para evitar la propagación de gérmenes a otras personas  Utilice agua y [place]  Use gel antibacterial cuando no tenga jabón ni agua disponibles  [employer] las freedom después de usar el baño, toser o estornudar  Lávese las freedom antes de comer o preparar alimentos  Acuda a dean consultas de control con bryant médico según le indicaron  Anote dean preguntas para que se acuerde de hacerlas avtar dean visitas  © 2017 [facility] [facility] Information is for End User's use only and may not be sold, redistributed or otherwise used for commercial purposes  All illustrations and images included in CareNotes® are the copyrighted property of A D A M , Inc  or Lit Pan  Esta información es sólo para uso en educación  Bryant intención no es darle un consejo médico sobre enfermedades o tratamientos  Colsulte con bryant [place] farmacéutico antes de seguir cualquier régimen médico para saber si es seguro y efectivo para usted  Dermatitis por contacto   LO QUE NECESITA SABER:   La dermatitis de contacto es un sarpullido  Se desarrolla cuando usted toca algo que irrita bryant piel o que provoca lexi reacción alérgica  INSTRUCCIONES [facility]BRE [facility]:   Llame al 911 en julissa de presentar lo siguiente:   · Usted tiene dificultad repentina para respirar  · [place] garganta se inflama y tiene dificultad para comer  · Bryant flora está inflamada    Pregúntele a bryant Ardie Albert vitaminas y minerales son adecuados para usted  · Usted tiene fiebre  · Dean ampollas están drenando pus  · José sarpullido se propaga o no mejora aún después del tratamiento  · Usted tiene preguntas o inquietudes acerca de josé condición o cuidado  Medicamentos:   · Medicamentos,  ayudan a disminuir la comezón y la inflamación  Los medicamentos serán de uso tópico para aplicarse sobre josé salpullido o en píldora  · [place] dean medicamentos aj se le haya indicado  Consulte con josé médico si usted rey que josé medicamento no le está ayudando o si presenta efectos secundarios  Infórmele si es alérgico a cualquier medicamento  Mantenga lexi lista actualizada de los [employer], las vitaminas y los productos herbales que minerva  Incluya los siguientes datos de los medicamentos: cantidad, frecuencia y motivo de administración  Traiga con usted la lista o los envases de la píldoras a dean citas de seguimiento  Lleve la lista de los medicamentos con usted en julissa de lexi emergencia  Controle josé dermatitis de contacto:   · [place] tyson de jenn o duchas cortas en agua fría  Use jabón suave o algún limpiador que no tenga jabón  Agregue deepak, bicarbonato de sodio o harina de maíz al agua de la jenn para ayudar a disminuir la irritación en la piel  · Evite irritantes de la piel , aj [place], productos para el mary ann, jabones y limpiadores  Use productos que no contengan perfume o tintes  · Aplique lexi compresa fría a josé sarpullido  Gray ayudará a aliviar josé piel  · Mantenga josé piel húmeda  Frote crema o loción sin perfume en josé piel para impedir la resequedad y comezón  Fidencio esto tan pronto termine de bañarse o ducharse cuando josé piel aún esté mojada  Programe lexi eliu con josé médico o dermatólogo dentro de 2 a 3 días:  Anote dean preguntas para que se acuerde de hacerlas avtar dean visitas     © 2017 [address] Information is for End User's use only and may not be sold, redistributed or otherwise used for commercial purposes  All illustrations and images included in CareNotes® are the copyrighted property of A D A M , Inc  or Lit Pan  Esta información es sólo para uso en educación  José intención no es darle un consejo médico sobre enfermedades o tratamientos  Colsulte con josé Charna Heckler farmacéutico antes de seguir cualquier régimen médico para saber si es seguro y efectivo para usted

## 2018-03-12 DIAGNOSIS — E11.9 TYPE 2 DIABETES MELLITUS WITHOUT COMPLICATION, WITHOUT LONG-TERM CURRENT USE OF INSULIN (HCC): ICD-10-CM

## 2018-11-20 ENCOUNTER — HOSPITAL ENCOUNTER (EMERGENCY)
Facility: HOSPITAL | Age: 58
Discharge: HOME | End: 2018-11-20
Attending: EMERGENCY MEDICINE

## 2018-11-20 VITALS
WEIGHT: 160 LBS | SYSTOLIC BLOOD PRESSURE: 135 MMHG | BODY MASS INDEX: 30.21 KG/M2 | DIASTOLIC BLOOD PRESSURE: 80 MMHG | RESPIRATION RATE: 18 BRPM | TEMPERATURE: 98.2 F | OXYGEN SATURATION: 98 % | HEART RATE: 100 BPM

## 2018-11-20 DIAGNOSIS — T78.40XA ALLERGIC REACTION, INITIAL ENCOUNTER: Primary | ICD-10-CM

## 2018-11-20 DIAGNOSIS — L50.9 HIVES: ICD-10-CM

## 2018-11-20 PROCEDURE — 99283 EMERGENCY DEPT VISIT LOW MDM: CPT

## 2018-11-20 PROCEDURE — 96372 THER/PROPH/DIAG INJ SC/IM: CPT

## 2018-11-20 RX ORDER — HYDROXYZINE HYDROCHLORIDE 25 MG/1
25 TABLET, FILM COATED ORAL EVERY 6 HOURS
Qty: 12 TABLET | Refills: 0 | Status: SHIPPED | OUTPATIENT
Start: 2018-11-20

## 2018-11-20 RX ORDER — FAMOTIDINE 20 MG/1
40 TABLET, FILM COATED ORAL ONCE
Status: COMPLETED | OUTPATIENT
Start: 2018-11-20 | End: 2018-11-20

## 2018-11-20 RX ORDER — HYDROXYZINE HYDROCHLORIDE 25 MG/1
25 TABLET, FILM COATED ORAL ONCE
Status: COMPLETED | OUTPATIENT
Start: 2018-11-20 | End: 2018-11-20

## 2018-11-20 RX ORDER — METHYLPREDNISOLONE SODIUM SUCCINATE 125 MG/2ML
125 INJECTION, POWDER, LYOPHILIZED, FOR SOLUTION INTRAMUSCULAR; INTRAVENOUS ONCE
Status: COMPLETED | OUTPATIENT
Start: 2018-11-20 | End: 2018-11-20

## 2018-11-20 RX ORDER — PREDNISONE 20 MG/1
20 TABLET ORAL 3 TIMES DAILY
Qty: 12 TABLET | Refills: 0 | Status: SHIPPED | OUTPATIENT
Start: 2018-11-20 | End: 2018-11-24

## 2018-11-20 RX ADMIN — HYDROXYZINE HYDROCHLORIDE 25 MG: 25 TABLET ORAL at 11:29

## 2018-11-20 RX ADMIN — FAMOTIDINE 40 MG: 20 TABLET ORAL at 11:29

## 2018-11-20 RX ADMIN — METHYLPREDNISOLONE SODIUM SUCCINATE 125 MG: 125 INJECTION, POWDER, FOR SOLUTION INTRAMUSCULAR; INTRAVENOUS at 11:30

## 2018-11-20 NOTE — ED PROVIDER NOTES
History  Chief Complaint   Patient presents with    Allergic Reaction     Patient started with hives last night, unsure of what she came into contact with  Denies all other symptoms other than itchy hives   Itching     61 yo F with PMH DM, asthma and HTN presenting with allergic reaction  Pt reports having similar allergic reaction years ago  She is unsure what prompted the reaction this time but states she broke out in hives last night  She took tylenol allergy with only minimal relief  She denies any wheezing, SOB, cough or stridor  She denies any new meds, new foods or restaurant  She denies any new detergents, lotions or soaps  Prior to Admission Medications   Prescriptions Last Dose Informant Patient Reported? Taking?   acetaminophen (TYLENOL) 500 mg tablet   No No   Sig: Take 2 tablets (1,000 mg total) by mouth every 6 (six) hours as needed for fever   albuterol (PROVENTIL HFA,VENTOLIN HFA) 90 mcg/act inhaler   Yes No   Sig: Inhale 2 puffs every 6 (six) hours as needed for wheezing  gabapentin (NEURONTIN) 600 MG tablet   Yes No   Sig: Take 600 mg by mouth daily Indications: Restless Leg Syndrome  lisinopril (ZESTRIL) 20 mg tablet   Yes No   Sig: Take 20 mg by mouth daily     loratadine (CLARITIN) 10 mg tablet   No No   Sig: Take 1 tablet (10 mg total) by mouth daily as needed for allergies   metFORMIN (GLUCOPHAGE) 1000 MG tablet   No No   Sig: Take 1 tablet (1,000 mg total) by mouth 2 (two) times a day   ondansetron (ZOFRAN-ODT) 4 mg disintegrating tablet   No No   Sig: Take 1 tablet (4 mg total) by mouth every 6 (six) hours as needed for nausea or vomiting   pioglitazone (ACTOS) 15 mg tablet   Yes No   Sig: Take 15 mg by mouth daily      Facility-Administered Medications: None       Past Medical History:   Diagnosis Date    Asthma     Bloody stools     Diabetes mellitus ([address] )     Hypertension     Insomnia     Pneumonia        Past Surgical History:   Procedure Laterality Date    CHOLECYSTECTOMY      HYSTERECTOMY      HYSTERECTOMY         Family History   Problem Relation Age of Onset    Asthma Mother     Heart disease Father     Hypertension Father     Diabetes Sister         Diabetes Mellitus    Hypertension Sister     Kidney disease Sister     Cancer Sister     Osteoporosis Sister     Heart disease Brother     Stroke Brother     Cancer Brother     Asthma Son      I have reviewed and agree with the history as documented  Social History   Substance Use Topics    Smoking status: Never Smoker    Smokeless tobacco: Never Used    Alcohol use No        Review of Systems   All other systems reviewed and are negative  Physical Exam  Physical Exam   Constitutional: She is oriented to person, place, and time  She appears well-developed and well-nourished  No distress  HENT:   Head: Normocephalic and atraumatic  Eyes: Conjunctivae are normal    EOM grossly intact   Neck: Normal range of motion  Neck supple  No JVD present  Cardiovascular: Normal rate  Pulmonary/Chest: Effort normal    Abdominal: Soft  Musculoskeletal:   FROM, steady gait, cap refill brisk, strength and sensation grossly intact throughout   Neurological: She is alert and oriented to person, place, and time  Skin: Skin is warm and dry  Capillary refill takes less than 2 seconds  Erythematous papules anterior neck and b/l UE, no excoriations or active drainage, no active bleeding   Psychiatric: She has a normal mood and affect  Her behavior is normal    Nursing note and vitals reviewed        Vital Signs  ED Triage Vitals [11/20/18 1030]   Temperature Pulse Respirations Blood Pressure SpO2   98 2 °F (36 8 °C) 100 18 135/80 98 %      Temp Source Heart Rate Source Patient Position - Orthostatic VS BP Location FiO2 (%)   Tympanic Monitor Sitting Left arm --      Pain Score       No Pain           Vitals:    11/20/18 1030   BP: 135/80   Pulse: 100   Patient Position - Orthostatic VS: Sitting Visual Acuity      ED Medications  Medications   methylPREDNISolone sodium succinate (Solu-MEDROL) injection 125 mg (125 mg Intramuscular Given 11/20/18 1130)   famotidine (PEPCID) tablet 40 mg (40 mg Oral Given 11/20/18 1129)   hydrOXYzine HCL (ATARAX) tablet 25 mg (25 mg Oral Given 11/20/18 1129)       Diagnostic Studies  Results Reviewed     None                 No orders to display              Procedures  Procedures       Phone Contacts  ED Phone Contact    ED Course                               MDM  Number of Diagnoses or Management Options  Allergic reaction, initial encounter:   Hives:   Diagnosis management comments: 69-year-old female presenting with hives starting yesterday, pt denies anything new in diet or lifestyle that could have caused this reaction, given solumedrol, atarax and pepcid here for allergic rxn, will send home with short course of steroids and atarax to use at home as needed, pt is in no resp distress, non toxic appearing, no acute distress, f/u with pcp as needed outpatient    strict return to ED precautions discussed  Pt verbalizes understanding and agrees with plan  Pt is stable for discharge    Portions of the record may have been created with voice recognition software  Occasional wrong word or "sound a like" substitutions may have occurred due to the inherent limitations of voice recognition software  Read the chart carefully and recognize, using context, where substitutions have occurred  CritCare Time    Disposition  Final diagnoses:    Allergic reaction, initial encounter   Hives     Time reflects when diagnosis was documented in both MDM as applicable and the Disposition within this note     Time User Action Codes Description Comment    11/20/2018 11:31 AM Kalyani MANCIA Add [[id number]] Allergic reaction, initial encounter     11/20/2018 11:31 AM Myah, Nisa0 [facility]       ED Disposition     ED Disposition Condition Comment    Discharge  Blanca Pages Julito discharge to home/self care  Condition at discharge: Good        Follow-up Information     Follow up With Specialties Details Why Contact Info Additional Information    [address] [facility] Schedule an appointment as soon as possible for a visit As needed [address] [phone] [place] [address], [address] [place] [address], [phone]          Patient's Medications   Discharge Prescriptions    HYDROXYZINE HCL (ATARAX) 25 MG TABLET    Take 1 tablet (25 mg total) by mouth every 6 (six) hours       Start Date: 11/20/2018End Date: --       Order Dose: 25 mg       Quantity: 12 tablet    Refills: 0    PREDNISONE 20 MG TABLET    Take 1 tablet (20 mg total) by mouth 3 (three) times a day for 4 days       Start Date: 11/20/2018End Date: 11/24/2018       Order Dose: 20 mg       Quantity: 12 tablet    Refills: 0     No discharge procedures on file      ED Provider  Electronically Signed by           Donald Silva PA-C  11/20/18 Yury Rodriguez [id number] Brian Howard PA-C  11/20/18 1144

## 2018-11-20 NOTE — DISCHARGE INSTRUCTIONS
Acute Rash   WHAT YOU NEED TO KNOW:   A rash is irritation, redness, or itchiness in the skin or mucus membranes  Mucus membranes are areas such as the lining of your nose or throat  Acute means the rash starts suddenly, worsens quickly, and lasts a short time  An acute rash may be caused by a disease, such as hepatitis or vasculitis  The rash may be a reaction to something you are allergic to, such as certain foods, or latex  Certain medicines, including antibiotics, NSAIDs, prescription pain medicine, and aspirin can also cause a rash  DISCHARGE INSTRUCTIONS:   Return to the emergency department if:   · You have sudden trouble breathing or chest pain  · You are vomiting, have a headache or muscle aches, and your throat hurts  Contact your healthcare provider if:   · You have a fever  · You get open wounds from scratching your skin, or you have a wound that is red, swollen, or painful  · Your rash lasts longer than 3 months  · You have swelling or pain in your joints  · You have questions or concerns about your condition or care  Medicines:  If your rash does not go away on its own, you may need the following medicines:  · Antihistamines  may be given to help decrease itching  · Steroids  may be given to decrease inflammation  · Antibiotics  help fight or prevent a bacterial infection  · Take your medicine as directed  Contact your healthcare provider if you think your medicine is not helping or if you have side effects  Tell him of her if you are allergic to any medicine  Keep a list of the medicines, vitamins, and herbs you take  Include the amounts, and when and why you take them  Bring the list or the pill bottles to follow-up visits  Carry your medicine list with you in case of an emergency  Prevent a rash or care for your skin when you have a rash:  Dry skin can lead to more problems  Do not scratch your skin if it itches  You may cause a skin infection by scratching   The following may prevent dry skin, and help your skin look better:  · Use thick cream lotions or petroleum jelly to help soothe your rash  These products work well on areas with thick skin, such as your feet  Cool compresses may also be used to soothe your skin  Apply a cool compress or a cool, wet towel, and then cover it with a dry towel  · Use lukewarm water when you bathe  Hot water may damage your skin more  Pat your skin dry  Do not rub your skin with a towel  · Use detergents, soaps, shampoos, and bubble baths made for sensitive skin  Wear clothes that are made of cotton instead of nylon or wool  Cotton is softer, so it will not hurt your skin as much  Follow up with your healthcare provider as directed: You may need to see a dermatologist if healthcare providers do not know what is causing your rash  You may also need to see a dermatologist if your rash does not get better even with treatment  You may need to see a dietitian if you have allergies to foods  Write down your questions so you remember to ask them during your visits  © 2017 [address] [facility] Information is for End User's use only and may not be sold, redistributed or otherwise used for commercial purposes  All illustrations and images included in CareNotes® are the copyrighted property of A D A [employer]  or [employer]  The above information is an  only  It is not intended as medical advice for individual conditions or treatments  Talk to your doctor, nurse or pharmacist before following any medical regimen to see if it is safe and effective for you